# Patient Record
Sex: FEMALE | Race: WHITE | NOT HISPANIC OR LATINO | Employment: FULL TIME | URBAN - METROPOLITAN AREA
[De-identification: names, ages, dates, MRNs, and addresses within clinical notes are randomized per-mention and may not be internally consistent; named-entity substitution may affect disease eponyms.]

---

## 2020-07-20 ENCOUNTER — OFFICE VISIT (OUTPATIENT)
Dept: URGENT CARE | Facility: PHYSICIAN GROUP | Age: 70
End: 2020-07-20
Payer: MEDICARE

## 2020-07-20 ENCOUNTER — HOSPITAL ENCOUNTER (OUTPATIENT)
Facility: MEDICAL CENTER | Age: 70
End: 2020-07-20
Attending: PHYSICIAN ASSISTANT
Payer: MEDICARE

## 2020-07-20 VITALS
SYSTOLIC BLOOD PRESSURE: 98 MMHG | RESPIRATION RATE: 18 BRPM | BODY MASS INDEX: 44.3 KG/M2 | HEIGHT: 63 IN | WEIGHT: 250 LBS | DIASTOLIC BLOOD PRESSURE: 66 MMHG | OXYGEN SATURATION: 94 % | HEART RATE: 68 BPM | TEMPERATURE: 97.7 F

## 2020-07-20 DIAGNOSIS — Z20.822 EXPOSURE TO COVID-19 VIRUS: ICD-10-CM

## 2020-07-20 DIAGNOSIS — J30.9 ALLERGIC RHINITIS, UNSPECIFIED SEASONALITY, UNSPECIFIED TRIGGER: ICD-10-CM

## 2020-07-20 LAB — COVID ORDER STATUS COVID19: NORMAL

## 2020-07-20 PROCEDURE — U0003 INFECTIOUS AGENT DETECTION BY NUCLEIC ACID (DNA OR RNA); SEVERE ACUTE RESPIRATORY SYNDROME CORONAVIRUS 2 (SARS-COV-2) (CORONAVIRUS DISEASE [COVID-19]), AMPLIFIED PROBE TECHNIQUE, MAKING USE OF HIGH THROUGHPUT TECHNOLOGIES AS DESCRIBED BY CMS-2020-01-R: HCPCS

## 2020-07-20 PROCEDURE — 99214 OFFICE O/P EST MOD 30 MIN: CPT | Mod: CS | Performed by: PHYSICIAN ASSISTANT

## 2020-07-20 RX ORDER — ATORVASTATIN CALCIUM 20 MG/1
TABLET, FILM COATED ORAL
COMMUNITY
Start: 2020-07-20

## 2020-07-20 RX ORDER — LOSARTAN POTASSIUM 100 MG/1
100 TABLET ORAL DAILY
COMMUNITY
Start: 2020-07-20

## 2020-07-20 RX ORDER — HYDROCHLOROTHIAZIDE 12.5 MG/1
12.5 TABLET ORAL DAILY
COMMUNITY
Start: 2020-07-20

## 2020-07-20 RX ORDER — METFORMIN HYDROCHLORIDE 500 MG/1
TABLET, EXTENDED RELEASE ORAL
COMMUNITY
Start: 2020-07-20

## 2020-07-20 RX ORDER — INSULIN DETEMIR 100 [IU]/ML
20 INJECTION, SOLUTION SUBCUTANEOUS NIGHTLY
COMMUNITY
Start: 2020-07-20 | End: 2020-10-19

## 2020-07-20 ASSESSMENT — ENCOUNTER SYMPTOMS
NAUSEA: 0
ABDOMINAL PAIN: 0
EYES NEGATIVE: 1
HEADACHES: 0
DIARRHEA: 0
MYALGIAS: 1
FEVER: 0
SORE THROAT: 0
SINUS PAIN: 0
CHILLS: 0
SHORTNESS OF BREATH: 0
COUGH: 0
VOMITING: 0
WHEEZING: 0

## 2020-07-20 NOTE — PROGRESS NOTES
"Subjective:   Anitha Banegas is a 69 y.o. female who presents for Sinus Problem (congestion)      Sinus Problem   This is a new problem. Associated symptoms include congestion. Pertinent negatives include no chills, coughing, ear pain, headaches, shortness of breath or sore throat.   Onset of symptoms 4 days   Sinus pressure.  No pain.    Nasal congestion  Runny nose.     Denies any headache, sore throat, cough, shortness of breath, chest pain, abdominal pain, no recent nausea, vomiting, diarrhea. No loss of sense of taste or smell. Denies any fatigue. Mild body aches but \"nothing major\".     10 days ago Close exposure to positive COVID-19. Same home.     Review of Systems   Constitutional: Negative for chills, fever and malaise/fatigue.   HENT: Positive for congestion. Negative for ear pain, sinus pain and sore throat.    Eyes: Negative.    Respiratory: Negative for cough, shortness of breath and wheezing.    Cardiovascular: Negative for chest pain.   Gastrointestinal: Negative for abdominal pain, diarrhea, nausea and vomiting.   Musculoskeletal: Positive for myalgias.   Skin: Negative.    Neurological: Negative for headaches.       Medications:    • atorvastatin Tabs  • Canagliflozin Tabs  • hydroCHLOROthiazide  • Levemir FlexTouch Sopn  • losartan Tabs  • metFORMIN ER Tb24    Allergies: Morphine and Gabapentin    Problem List: Anitha Banegas does not have a problem list on file.    Surgical History:  No past surgical history on file.    Past Social Hx: Anitha Banegas  reports that she has quit smoking. She has never used smokeless tobacco. She reports current alcohol use. She reports that she does not use drugs.     Past Family Hx:  Anitha Banegas family history is not on file.     Problem list, medications, and allergies reviewed by myself today in Epic.     Objective:     BP (!) 98/66   Pulse 68   Temp 36.5 °C (97.7 °F) (Temporal)   Resp 18   Ht 1.6 m (5' 3\")   Wt 113.4 kg (250 lb)   SpO2 94%   BMI 44.29 " kg/m²     Physical Exam  Vitals signs reviewed.   Constitutional:       General: She is not in acute distress.     Appearance: Normal appearance. She is not ill-appearing or toxic-appearing.   HENT:      Nose: Mucosal edema and rhinorrhea present. Rhinorrhea is clear.      Mouth/Throat:      Mouth: Mucous membranes are moist.      Pharynx: Oropharynx is clear. No oropharyngeal exudate or posterior oropharyngeal erythema.   Eyes:      Conjunctiva/sclera: Conjunctivae normal.   Cardiovascular:      Rate and Rhythm: Normal rate and regular rhythm.      Heart sounds: Normal heart sounds.   Pulmonary:      Effort: Pulmonary effort is normal. No respiratory distress.      Breath sounds: Normal breath sounds. No wheezing, rhonchi or rales.   Lymphadenopathy:      Cervical: No cervical adenopathy.   Skin:     General: Skin is warm and dry.   Neurological:      General: No focal deficit present.      Mental Status: She is alert.   Psychiatric:         Mood and Affect: Mood normal.         Behavior: Behavior normal.         Assessment/Plan:     Diagnosis and associated orders:     1. Exposure to COVID-19 virus  COVID/SARS COV-2 PCR   2. Allergic rhinitis, unspecified seasonality, unspecified trigger        Comments/MDM:     • Allergic rhinitis: Flonase nasal spray, Zyrtec or Claritin in the morning time, nasal saline sprays or Leela pot, Mucinex   Discussed with patient signs and symptoms most likely are due to a viral etiology.     Due to exposure to positive COVID-19, test for COVID-19. We will call back for results and appropriate further instructions.   Symptomatic care.  Plenty of oral hydration and rest     Infection control measures at home. Stay away from people, Hand washing, covering sneeze/cough, disinfect surfaces.   Remain home from work, school, and other populated environments. Work note provided.   Discharge Instructions on COVID-19 and resources handed to patient.   Overall, the patient is well-appearing.  Normal vital signs and benign examination. Normal lung examination. Suspicions for pneumonia are low. Therefore, antibiotics are not indicated at this time.      I confirmed the patient's mobile phone number, that their voicemail was set up, and received verbal consent to leave a voicemail if they do not answer the call.  The patient was amenable with this means of communication.      Red flags discussed and indications to immediately call 911 or present to the Emergency Department.   Supportive care, differential diagnoses, and indications for immediate follow-up discussed with patient.    Pathogenesis of diagnosis discussed including typical length and natural progression. Patient expresses understanding and agrees to plan.    Advised the patient to follow-up with the primary care physician for recheck, reevaluation, and consideration of further management.    Please note that this dictation was created using voice recognition software. I have made a reasonable attempt to correct obvious errors, but I expect that there are errors of grammar and possibly content that I did not discover before finalizing the note.    This note was electronically signed by Master Kang PA-C

## 2020-07-20 NOTE — PATIENT INSTRUCTIONS

## 2020-07-21 ENCOUNTER — TELEPHONE (OUTPATIENT)
Dept: URGENT CARE | Facility: PHYSICIAN GROUP | Age: 70
End: 2020-07-21

## 2020-07-21 DIAGNOSIS — U07.1 COVID-19 VIRUS DETECTED: ICD-10-CM

## 2020-07-21 LAB
SARS-COV-2 RNA RESP QL NAA+PROBE: DETECTED
SPECIMEN SOURCE: ABNORMAL

## 2020-07-21 NOTE — TELEPHONE ENCOUNTER
Spoke to the patient on the phone regarding positive COVID-19 result.     Patient states she is feeling a lot better compared to yesterday.  She states she no longer has any more body aches.  Only complains of mild fatigue but otherwise is feeling okay.  Denies any chest pain or shortness of breath.     Quarantine and infectious control measures discussed.    Supportive care discussed    Return to the urgent care if symptoms are persisting or any worsening.    ER precautions discussed    The patient verbalized understanding and agreement.  Patient had no further questions or concerns.  The patient appreciated the call.